# Patient Record
Sex: MALE | Race: WHITE | NOT HISPANIC OR LATINO | URBAN - METROPOLITAN AREA
[De-identification: names, ages, dates, MRNs, and addresses within clinical notes are randomized per-mention and may not be internally consistent; named-entity substitution may affect disease eponyms.]

---
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
## 2021-05-29 NOTE — TELEPHONE ENCOUNTER
LM for Ed that he is due for a physical.  Please help him schedule or update PCP if they have changed Primary Care. Maria Elena Guzman LPN

## 2021-06-02 VITALS — WEIGHT: 250.4 LBS

## 2021-06-02 VITALS — WEIGHT: 249.4 LBS

## 2021-06-16 PROBLEM — L05.01 PILONIDAL CYST WITH ABSCESS: Status: ACTIVE | Noted: 2018-09-27

## 2021-06-19 NOTE — LETTER
Letter by Samantha Lynch CNP at      Author: Samantha Lynch CNP Service: -- Author Type: --    Filed:  Encounter Date: 5/22/2019 Status: (Other)         Ed Carreon  [id number] [facility] [id number]      September 12, 2019      Dear Ed,    As a valued [facility] patient, your healthcare needs are our priority.  Your health care team has determined that you are due for an appointment regarding your yearly physical..    To help prevent delays in your care, please call the [facility] at [phone].    We look forward to partnering with you to achieve optimal health and wellbeing.    Sincerely,  Your care team at [facility] and [facility]

## 2021-06-20 NOTE — PROGRESS NOTES
ASSESSMENT/PLAN:  1. Abscess of anal or rectal region - on day 3 of ciprofloxacin for gluteal abscess/pilonidal cyst that has persistent purulent, brown-tinged, malodorous drainage that is getting worse after being incised and drained two days ago in  ED. I will obtain culture here, but at this point, I think it is safest to have Ed return to the ED. He seems a good candidate for admission, possibly for IV antibiotics and/or more involved I&D under anesthesia. Family asked what ED to go to, so suggested [facility]'s. They are leaning more toward  since they were just there and insurance covers it. Left decision to them.  - Culture, Wound is pending      Orders Placed This Encounter   Procedures     Culture, Wound     There are no discontinued medications.      CHIEF COMPLAINT:  Chief Complaint   Patient presents with     Follow-up      ED 9/26, abscess       HISTORY OF PRESENT ILLNESS:  Ed is a 17 y.o. male on day 3 of ciprofloxacin for gluteal abscess presenting to the clinic today with ongoing copious drainage from the site where the abscess was I&D'd at [facility]. He initially came to clinic, and was sent to the ED for I&D. The provider there incised the area and noted brown drainage that seemed to be fecal in nature. He underwent CT to look for a fistula, but none was identified. He also had labs with slightly elevated WBC. Blood culture is pending. The provider at  discussed the case over the phone with a Colorectal Surgeon, but it doesn't sound like any follow up was recommended at that point. He was discharged with instructions to have close follow up.     Since discharge from , he's continued to have so much drainage that he has to change the dressing every 20 minutes or so. Mom thinks it's getting worse in terms of amount and redness around the area. He still hasn't had a fever. This has never happened before.    REVIEW OF SYSTEMS:   General: No fever  Eyes: No eye  discharge  ENT: No nasal congestion or rhinorrhea. No pharyngitis. No otalgia.  Resp: No SOB, cough or wheezing  GI: No diarrhea, nausea, or emesis  : No dysuria  MS: No joint/bone/muscle tenderness  Skin: See HPI  Neuro: No headaches  Lymph/Hematologic: No gland swelling  All other systems are negative.    PFSH:  No other pertinent history reviewed.    Past Medical History:   Diagnosis Date     Asperger's disorder        Family History   Problem Relation Age of Onset     Hyperthyroidism Mother      Autoimmune disease Maternal Grandmother        Past Surgical History:   Procedure Laterality Date     TONSILLECTOMY AND ADENOIDECTOMY         Social History     Social History     Marital status: Single     Spouse name: N/A     Number of children: N/A     Years of education: N/A     Occupational History     Not on file.     Social History Main Topics     Smoking status: Never Smoker     Smokeless tobacco: Never Used     Alcohol use Not on file     Drug use: Not on file     Sexual activity: Not on file     Other Topics Concern     Not on file     Social History Narrative    Mother - Nelia    Sister - Felicity       TOBACCO USE:  History   Smoking Status     Never Smoker   Smokeless Tobacco     Never Used       VITALS:  Vitals:    09/28/18 1656   BP: 100/58   Patient Site: Right Arm   Patient Position: Sitting   Cuff Size: Adult Regular   Pulse: 92   Weight: (!) 249 lb 6.4 oz (113.1 kg)     Wt Readings from Last 3 Encounters:   09/28/18 (!) 249 lb (112.9 kg) (>99 %, Z= 2.47)*   09/28/18 (!) 249 lb 6.4 oz (113.1 kg) (>99 %, Z= 2.48)*   09/26/18 (!) 251 lb (113.9 kg) (>99 %, Z= 2.50)*     * Growth percentiles are based on CDC 2-20 Years data.     There is no height or weight on file to calculate BMI.    PHYSICAL EXAM:  General: Alert, no acute distress.   Eyes: Conjunctivae clear.   Nose: Clear.    Throat: Moist mucosa  Lungs: Clear to auscultation bilaterally. No wheezes, rhonchi, or rales. No prolongation of expiratory  phase. No tachypnea, retractions, or increased work of breathing.  Cardiac: Regular rate and rhythm, no murmur audible.  Abdomen: Soft, nontender, nondistended.   Skin: Superior gluteal fold with pooling of purulent, brown-tinged, malodorous fluid that continuously drains even without expressing area; minimal erythema of surrounding skin  Hematologic/Lymph/Immune: No lymphadenopathy.    ADDITIONAL HISTORY SUMMARIZED (2): None.  DECISION TO OBTAIN EXTRA INFORMATION (1): None.   RADIOLOGY TESTS (1): None.  LABS (1): See above.  MEDICINE TESTS (1): None.  INDEPENDENT REVIEW (2 each): None.     Pertinent Results/Imaging: Reviewed.      MEDICATIONS:  Current Outpatient Prescriptions   Medication Sig Dispense Refill     albuterol (VENTOLIN HFA) 90 mcg/actuation inhaler Inhale 2 puffs every 6 (six) hours as needed for wheezing or shortness of breath. 1 Inhaler 3     ciprofloxacin HCl (CIPRO) 500 MG tablet Take 1 tablet (500 mg total) by mouth 2 (two) times a day for 7 days. 14 tablet 0     No current facility-administered medications for this visit.        The visit lasted a total of 30 minutes that I spent face to face with the patient. Over 50% of the time was spent counseling and educating the patient about management plan.    Emmy Johnson MD  09/28/18

## 2021-06-20 NOTE — PROGRESS NOTES
[facility] Pediatric Acute Visit     HPI:  Ed Carreon is a 17 y.o.  male who presents to the clinic with mom.  He presents today with a 3-day history of a boil on his buttocks.  He has been experiencing increased discomfort and pain in the last 24 hours.  He was afebrile until last night when he was noted for a fever of 100.9.  He did take some Advil this morning but is still unable to sit on his buttocks without extreme discomfort.  He has no prior history of boils, abscesses, or MRSA.        Past Med / Surg History:  Past Medical History:   Diagnosis Date     Asperger's disorder      Past Surgical History:   Procedure Laterality Date     TONSILLECTOMY AND ADENOIDECTOMY         Fam / Soc History:  Family History   Problem Relation Age of Onset     Hyperthyroidism Mother      Autoimmune disease Maternal Grandmother      Social History     Social History Narrative    Mother - Nelia    Sister - Felicity         ROS:  Gen: No fever or fatigue  Eyes: No eye discharge.   ENT: No nasal congestion or rhinorrhea. No pharyngitis. No otalgia.  Resp: No SOB, cough or wheezing.  GI:No diarrhea, nausea or vomiting  :No dysuria  MS: No joint/bone/muscle tenderness.  Skin: Positive for a boil on his buttocks.  Neuro: No headaches  Lymph/Hematologic: No gland swelling      Objective:  Vitals: Pulse 84  Temp 98.4  F (36.9  C) (Oral)   Wt (!) 250 lb 6.4 oz (113.6 kg)    Gen: Alert, well appearing  Musculoskeletal: Joints with full range-of-motion. Normal upper and lower extremities.  Skin: He is noted for a hard warm indurated abscess on the tip of his coccyx.  Neuro: Oriented. Normal reflexes; normal tone; no focal deficits appreciated. Appropriate for age.  Hematologic/Lymph/Immune: No cervical lymphadenopathy  Psychiatric: Appropriate affect      Pertinent results / imaging:  Reviewed     Assessment and Plan:    Ed Carreon is a 17  y.o. 10  m.o. male with:    1. Cellulitis  We discussed with the patient and his mother that  we will would not be able to do a good incision and drainage of this abscess here in the clinic in light of the amount of discomfort he is in.  We are referring him on to the emergency room at [facility] where they would be able to do some conscious sedation and better pain management and perform the incision and drainage there.  Mom agrees with that plan.          Samantha STONE  9/26/2018